# Patient Record
Sex: FEMALE | Race: WHITE | NOT HISPANIC OR LATINO | Employment: FULL TIME
[De-identification: names, ages, dates, MRNs, and addresses within clinical notes are randomized per-mention and may not be internally consistent; named-entity substitution may affect disease eponyms.]

---
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
## 2019-09-30 ENCOUNTER — ANCILLARY PROCEDURE (OUTPATIENT)
Dept: GENERAL RADIOLOGY | Facility: CLINIC | Age: 19
End: 2019-09-30
Attending: PHYSICIAN ASSISTANT
Payer: COMMERCIAL

## 2019-09-30 ENCOUNTER — OFFICE VISIT (OUTPATIENT)
Dept: URGENT CARE | Facility: URGENT CARE | Age: 19
End: 2019-09-30
Payer: COMMERCIAL

## 2019-09-30 VITALS
DIASTOLIC BLOOD PRESSURE: 70 MMHG | BODY MASS INDEX: 23.9 KG/M2 | SYSTOLIC BLOOD PRESSURE: 115 MMHG | OXYGEN SATURATION: 100 % | RESPIRATION RATE: 16 BRPM | HEART RATE: 88 BPM | WEIGHT: 140 LBS | HEIGHT: 64 IN
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
## 2019-09-30 DIAGNOSIS — M25.531 RIGHT WRIST PAIN: ICD-10-CM

## 2019-09-30 DIAGNOSIS — M25.531 RIGHT WRIST PAIN: Primary | ICD-10-CM

## 2019-09-30 PROCEDURE — 99203 OFFICE O/P NEW LOW 30 MIN: CPT | Performed by: PHYSICIAN ASSISTANT

## 2019-09-30 PROCEDURE — 73110 X-RAY EXAM OF WRIST: CPT | Mod: RT

## 2019-09-30 RX ORDER — IBUPROFEN 600 MG/1
600 TABLET, FILM COATED ORAL EVERY 6 HOURS PRN
Qty: 30 TABLET | Refills: 0 | Status: SHIPPED | OUTPATIENT
Start: 2019-09-30

## 2019-09-30 ASSESSMENT — MIFFLIN-ST. JEOR: SCORE: 1390.04

## 2019-09-30 NOTE — PROGRESS NOTES
"SUBJECTIVE:  Chief Complaint   Patient presents with     Wrist Pain     R wrist pain off and on for 3 monts. Pt states the last few weeks it has been constant.      Dionne Schulz is a 19 year old female presents with a chief complaint of right wrist pain and tenderness.  How: overuse injury.  The patient complained of mild and moderate pain  and has had decreased ROM.  Pain exacerbated by movement.  Relieved by rest.  She treated it initially with no therapy. This is the first time this type of injury has occurred to this patient.     Past Medical History:   Diagnosis Date     Hypertrophy of tonsil with adenoids      Primary hypotony of eye 11/99    Central hypotonia/static encephalopathy.     Allergies   Allergen Reactions     Azithromycin      Cefprozil      Cephalosporins      Penicillins      Social History     Tobacco Use     Smoking status: Never Smoker     Smokeless tobacco: Never Used   Substance Use Topics     Alcohol use: Not on file     Family History   Problem Relation Age of Onset     Allergies Mother         seaonal     Diabetes Mother      Cardiovascular Mother      Cancer Mother      Cancer Father      Alcohol/Drug Father         pat grandfather       ROS:  CONSTITUTIONAL:NEGATIVE for fever, chills, change in weight  INTEGUMENTARY/SKIN: NEGATIVE for worrisome rashes, moles or lesions  ENT/MOUTH: NEGATIVE for ear, mouth and throat problems  RESP:NEGATIVE for significant cough or SOB  CV: NEGATIVE for chest pain, palpitations or peripheral edema  GI: NEGATIVE for nausea, abdominal pain, heartburn, or change in bowel habits  : negative for and dysuria  MUSCULOSKELETAL: POSITIVE  for right wrist tenderness, DROM  NEURO: NEGATIVE for weakness, dizziness or paresthesias  VASC: NEGATIVE for cool extremities    EXAM:   /70 (BP Location: Right arm, Patient Position: Sitting, Cuff Size: Adult Regular)   Pulse 88   Resp 16   Ht 1.626 m (5' 4\")   Wt 63.5 kg (140 lb)   SpO2 100%   BMI 24.03 " kg/m    Gen: healthy,alert,no distress  Extremity: wrist has point tenderness dorsal wrist, localized.   There is not compromise to the distal circulation.  Pulses are +2 and CRT is brisk  NECK: supple, non-tender to palpation, FROM   CHEST: clear to auscultation  CV: regular rate and rhythm  EXTREMITIES: peripheral pulses normal  MS:  tender to palpation dorsal wrist  SKIN: no suspicious lesions or rashes  NEURO: Normal strength and tone, sensory exam grossly normal, mentation intact and speech normal    X-RAY was done and negative for acute changes including fracture Xray read by Erik Holbrook PA-C at time of visit    ASSESSMENT/PLAN      ICD-10-CM    1. Right wrist pain M25.531 XR Wrist Right G/E 3 Views     ibuprofen (ADVIL/MOTRIN) 600 MG tablet     order for DME   2. Overuse injury T14.8XXA XR Wrist Right G/E 3 Views     ibuprofen (ADVIL/MOTRIN) 600 MG tablet     order for DME       RICE treatment: Rest, Ice, compression, elevation   Wear wrist brace  Follow up with [facility] if symptoms persist  Note written for work

## 2019-09-30 NOTE — LETTER
September 30, 2019      Dionne Schulz  [address]  [place] [address]        To Whom It May Concern:    Dionne Schulz was seen on 9/30/19.  Please allow her to wear wrist brace while working.  There are no work restrictions.         Sincerely,        Erik Holbrook PA-C

## 2019-10-07 ENCOUNTER — HOSPITAL ENCOUNTER (EMERGENCY)
Facility: CLINIC | Age: 19
Discharge: HOME | End: 2019-10-07
Attending: PHYSICIAN ASSISTANT | Admitting: PHYSICIAN ASSISTANT
Payer: COMMERCIAL

## 2019-10-07 VITALS
HEART RATE: 99 BPM | DIASTOLIC BLOOD PRESSURE: 78 MMHG | OXYGEN SATURATION: 99 % | TEMPERATURE: 98.3 F | SYSTOLIC BLOOD PRESSURE: 134 MMHG | RESPIRATION RATE: 18 BRPM

## 2019-10-07 DIAGNOSIS — R05.9 COUGH: ICD-10-CM

## 2019-10-07 DIAGNOSIS — J06.9 UPPER RESPIRATORY TRACT INFECTION, UNSPECIFIED TYPE: ICD-10-CM

## 2019-10-07 LAB
DEPRECATED S PYO AG THROAT QL EIA: NORMAL
SPECIMEN SOURCE: NORMAL

## 2019-10-07 PROCEDURE — 87880 STREP A ASSAY W/OPTIC: CPT | Performed by: EMERGENCY MEDICINE

## 2019-10-07 PROCEDURE — 87081 CULTURE SCREEN ONLY: CPT | Performed by: PHYSICIAN ASSISTANT

## 2019-10-07 PROCEDURE — 99283 EMERGENCY DEPT VISIT LOW MDM: CPT

## 2019-10-07 PROCEDURE — 25000132 ZZH RX MED GY IP 250 OP 250 PS 637: Performed by: PHYSICIAN ASSISTANT

## 2019-10-07 RX ORDER — ACETAMINOPHEN 325 MG/1
650 TABLET ORAL ONCE
Status: COMPLETED | OUTPATIENT
Start: 2019-10-07 | End: 2019-10-07

## 2019-10-07 RX ORDER — BENZONATATE 200 MG/1
200 CAPSULE ORAL 3 TIMES DAILY PRN
Qty: 15 CAPSULE | Refills: 0 | Status: SHIPPED | OUTPATIENT
Start: 2019-10-07

## 2019-10-07 RX ADMIN — ACETAMINOPHEN 650 MG: 325 TABLET, FILM COATED ORAL at 22:10

## 2019-10-07 ASSESSMENT — ENCOUNTER SYMPTOMS
RHINORRHEA: 1
SORE THROAT: 1
COUGH: 1
NAUSEA: 1

## 2019-10-07 NOTE — ED AVS SNAPSHOT
[facility] Emergency Department  [address]  [facility] [address]  Phone:  [phone]  Fax:  [phone]                                    Dionne Schulz   MRN: [id number]    Department:  [facility] Emergency Department   Date of Visit:  10/7/2019           After Visit Summary Signature Page    I have received my discharge instructions, and my questions have been answered. I have discussed any challenges I see with this plan with the nurse or doctor.    ..........................................................................................................................................  Patient/Patient Representative Signature      ..........................................................................................................................................  Patient Representative Print Name and Relationship to Patient    ..................................................               ................................................  Date                                   Time    ..........................................................................................................................................  Reviewed by Signature/Title    ...................................................              ..............................................  Date                                               Time          22EPIC Rev 08/18

## 2019-10-07 NOTE — LETTER
October 7, 2019      To Whom It May Concern:      Dionne Schulz was seen in our Emergency Department today, 10/07/19. Please excuse her from work tomorrow(10/8). I expect her condition to improve over the next 2-3 days.  She may return to work/school when improved.    Sincerely,        Rowan Jeffries PA-C

## 2019-10-08 ASSESSMENT — ENCOUNTER SYMPTOMS: VOMITING: 0

## 2019-10-08 NOTE — DISCHARGE INSTRUCTIONS

## 2019-10-08 NOTE — ED TRIAGE NOTES
Here for left ear pain x3-4 days, sore throat, coughing, and nausea x4-5 week, sneezing x1 week. ABCs intact.

## 2019-10-08 NOTE — ED PROVIDER NOTES
History     Chief Complaint:  Ear Pain    HPI   Dionne Schulz is a 19 year old female with a history of asthma who presents with ear pain. The patient reports approximately one week of nausea(without vomiting), as well as four days of left sided ear pain, a productive cough with associated yellow phlegm, a sore throat, sneezing, an rhinorrhea with associated green mucous. As symptoms worsened tonight, she presents today for evaluation and treatment. Here the patient endorses utilizing 600 mg ibuprofen for management of wrist pain, therefore she is unsure if a fever has been present. The patient denies smoking. She denies any known sick contacts, recent travel, and has no further concerns at this time.     Allergies:  Azithromycin  Cefprozil  Cephalosporins  Penicillins   Augmentin    Medications:    Albuterol  Nuvaring  Advair diskus    Past Medical History:    Hypertrophy of tonsils  Lack of coordination  Primary hypotony of eye  Kidney, medullary sponge  Asthma  Von willebrand disease type IA  Attention deficit hyperactivity disorder  Obsessive compulsive disorder    Past Surgical History:    Tonsillectomy and adenoidectomy  Anes nose sinus surgery  Myringotomy with tube placement    Family History:    Mother: diabetes, cardiovascular, cancer  Father: cancer, alcohol/drug    Social History:  The patient was accompanied to the ED by her boyfriend.  Smoking Status: Never Smoker  Smokeless Tobacco: Never Used  PCP: Raúl Murcia  Marital Status:  Single      Review of Systems   HENT: Positive for ear pain, rhinorrhea, sneezing and sore throat.    Respiratory: Positive for cough.    Gastrointestinal: Positive for nausea. Negative for vomiting.   All other systems reviewed and are negative.    Physical Exam     Patient Vitals for the past 24 hrs:   BP Temp Temp src Pulse Heart Rate Resp SpO2   10/07/19 2142 134/78 98.3  F (36.8  C) Oral 99 99 18 99 %     Physical Exam  General: Resting comfortably.  Alert  and oriented.   Head:  The scalp, face, and head appear normal   Eyes:  Conjunctivae and sclerae are normal    ENT:    The oropharynx is normal    Uvula is in the midline     Mild pharyngeal erythema.  No tonsillar hypertrophy or exudate.  Structures are symmetric.    Bilateral TMs are normal without evidence of infection.   Neck:  No lymphadenopathy  CV:  Regular rate and rhythm     Normal S1/S2  Resp:  Lungs are clear to auscultation    Non-labored    No rales or wheezing   MS:  Normal muscular tone   Skin:  No rash or acute skin lesions noted   Neuro: Speech is normal and fluent.       Emergency Department Course     Laboratory:  Laboratory findings were communicated with the patient who voiced understanding of the findings.    Rapid Strep Screen: negative  Beta Strep Group A Culture: pending    Interventions:  2210 Tylenol 650 mg Oral    Emergency Department Course:    2144 A throat sample was obtained for laboratory testing as documented above.    2202 Nursing notes and vitals reviewed.    2207 I performed an exam of the patient as documented above.     2252 Patient rechecked and updated.      Findings and plan explained to the patient. Patient discharged home with instructions regarding supportive care, medications, and reasons to return. The importance of close follow-up was reviewed. The patient was prescribed tessalon.    Impression & Plan      Medical Decision Making:  Dionne Schulz is a 19 year old female who presents for evaluation of nasal congestion, cough and ear pain.  This is consistent with an upper respiratory tract infection.  There is no signs at this point of serious bacterial infection such as OM, retropharyngeal abscess, epiglottitis, peritonsillar abscess, strep pharyngitis, pneumonia, sinusitis, meningitis, bacteremia. Rapid strep test is negative. Culture pending. She was instructed she will only be contacted if her culture returns positive and initiated on antibiotic therapy at that  time. The patient is well hydrated and otherwise well-appearing.  Given clear lungs, no fever, no hypoxia and no respiratory distress I do not feel patient needs a CXR at this point as the probability of bacterial pneumonia is very unlikely.  The patient was treated with tylenol in the ED and is discharged with prescriptions for symptomatic treatment.  She will also increase rest and hydration. She was instructed about the importance of close followup with primary care physician is recommended and precautions are given to return to ED for fever > 103, respiratory distress, protracted vomiting, confusion or any worsening symptoms. All questions were answered prior to discharge. The patient understands and agrees to this plan.      Diagnosis:    ICD-10-CM    1. Cough R05    2. Upper respiratory tract infection, unspecified type J06.9      Disposition:   The patient is discharged to home.    Discharge Medications:        START taking      Dose / Directions   benzonatate 200 MG capsule  Commonly known as:  TESSALON      Dose:  200 mg  Take 1 capsule (200 mg) by mouth 3 times daily as needed for cough  Quantity:  15 capsule  Refills:  0              Where to get your medicines      Some of these will need a paper prescription and others can be bought over the counter. Ask your nurse if you have questions.    Bring a paper prescription for each of these medications    benzonatate 200 MG capsule       Scribe Disclosure:  I, Dulce Paz, am serving as a scribe at 9:54 PM on 10/7/2019 to document services personally performed by Rowan Jeffries PA-C based on my observations and the provider's statements to me.    [facility] EMERGENCY DEPARTMENT       Rowan Jeffries PA-C  10/08/19 1541

## 2019-10-10 LAB
BACTERIA SPEC CULT: NORMAL
SPECIMEN SOURCE: NORMAL

## 2019-10-10 NOTE — RESULT ENCOUNTER NOTE
Final Beta strep group A r/o culture is NEGATIVE for Group A streptococcus.    No treatment or change in treatment per [facility] Strep protocol.

## 2019-10-29 ENCOUNTER — HOSPITAL ENCOUNTER (EMERGENCY)
Facility: CLINIC | Age: 19
Discharge: HOME | End: 2019-10-29
Attending: EMERGENCY MEDICINE | Admitting: EMERGENCY MEDICINE
Payer: COMMERCIAL

## 2019-10-29 ENCOUNTER — APPOINTMENT (OUTPATIENT)
Dept: CT IMAGING | Facility: CLINIC | Age: 19
End: 2019-10-29
Attending: EMERGENCY MEDICINE
Payer: COMMERCIAL

## 2019-10-29 VITALS
DIASTOLIC BLOOD PRESSURE: 68 MMHG | SYSTOLIC BLOOD PRESSURE: 121 MMHG | TEMPERATURE: 98 F | OXYGEN SATURATION: 100 % | HEART RATE: 82 BPM | RESPIRATION RATE: 18 BRPM

## 2019-10-29 DIAGNOSIS — N10 ACUTE PYELONEPHRITIS: ICD-10-CM

## 2019-10-29 LAB
ALBUMIN SERPL-MCNC: 3.1 G/DL (ref 3.4–5)
ALBUMIN UR-MCNC: 20 MG/DL
ALP SERPL-CCNC: 54 U/L (ref 40–150)
ALT SERPL W P-5'-P-CCNC: 12 U/L (ref 0–50)
ANION GAP SERPL CALCULATED.3IONS-SCNC: 5 MMOL/L (ref 3–14)
APPEARANCE UR: ABNORMAL
AST SERPL W P-5'-P-CCNC: 11 U/L (ref 0–45)
B-HCG FREE SERPL-ACNC: <5 IU/L
BACTERIA #/AREA URNS HPF: ABNORMAL /HPF
BASOPHILS # BLD AUTO: 0.1 10E9/L (ref 0–0.2)
BASOPHILS NFR BLD AUTO: 0.4 %
BILIRUB SERPL-MCNC: 0.4 MG/DL (ref 0.2–1.3)
BILIRUB UR QL STRIP: NEGATIVE
BUN SERPL-MCNC: 11 MG/DL (ref 7–30)
CALCIUM SERPL-MCNC: 8.4 MG/DL (ref 8.5–10.1)
CHLORIDE SERPL-SCNC: 112 MMOL/L (ref 94–109)
CO2 SERPL-SCNC: 23 MMOL/L (ref 20–32)
COLOR UR AUTO: ABNORMAL
CREAT SERPL-MCNC: 0.69 MG/DL (ref 0.52–1.04)
DIFFERENTIAL METHOD BLD: ABNORMAL
EOSINOPHIL # BLD AUTO: 0.5 10E9/L (ref 0–0.7)
EOSINOPHIL NFR BLD AUTO: 3.8 %
ERYTHROCYTE [DISTWIDTH] IN BLOOD BY AUTOMATED COUNT: 12.4 % (ref 10–15)
GFR SERPL CREATININE-BSD FRML MDRD: >90 ML/MIN/{1.73_M2}
GLUCOSE SERPL-MCNC: 94 MG/DL (ref 70–99)
GLUCOSE UR STRIP-MCNC: NEGATIVE MG/DL
HCT VFR BLD AUTO: 37 % (ref 35–47)
HGB BLD-MCNC: 12.4 G/DL (ref 11.7–15.7)
HGB UR QL STRIP: ABNORMAL
IMM GRANULOCYTES # BLD: 0 10E9/L (ref 0–0.4)
IMM GRANULOCYTES NFR BLD: 0.3 %
KETONES UR STRIP-MCNC: NEGATIVE MG/DL
LEUKOCYTE ESTERASE UR QL STRIP: ABNORMAL
LYMPHOCYTES # BLD AUTO: 2.8 10E9/L (ref 0.8–5.3)
LYMPHOCYTES NFR BLD AUTO: 22.8 %
MCH RBC QN AUTO: 28.6 PG (ref 26.5–33)
MCHC RBC AUTO-ENTMCNC: 33.5 G/DL (ref 31.5–36.5)
MCV RBC AUTO: 85 FL (ref 78–100)
MONOCYTES # BLD AUTO: 0.7 10E9/L (ref 0–1.3)
MONOCYTES NFR BLD AUTO: 5.7 %
MUCOUS THREADS #/AREA URNS LPF: PRESENT /LPF
NEUTROPHILS # BLD AUTO: 8.1 10E9/L (ref 1.6–8.3)
NEUTROPHILS NFR BLD AUTO: 67 %
NITRATE UR QL: NEGATIVE
NRBC # BLD AUTO: 0 10*3/UL
NRBC BLD AUTO-RTO: 0 /100
PH UR STRIP: 6.5 PH (ref 5–7)
PLATELET # BLD AUTO: 226 10E9/L (ref 150–450)
POTASSIUM SERPL-SCNC: 3.7 MMOL/L (ref 3.4–5.3)
PROT SERPL-MCNC: 6.3 G/DL (ref 6.8–8.8)
RBC # BLD AUTO: 4.34 10E12/L (ref 3.8–5.2)
RBC #/AREA URNS AUTO: 9 /HPF (ref 0–2)
SODIUM SERPL-SCNC: 140 MMOL/L (ref 133–144)
SOURCE: ABNORMAL
SP GR UR STRIP: 1.01 (ref 1–1.03)
SQUAMOUS #/AREA URNS AUTO: 2 /HPF (ref 0–1)
UROBILINOGEN UR STRIP-MCNC: NORMAL MG/DL (ref 0–2)
WBC # BLD AUTO: 12.1 10E9/L (ref 4–11)
WBC #/AREA URNS AUTO: >182 /HPF (ref 0–5)

## 2019-10-29 PROCEDURE — 87088 URINE BACTERIA CULTURE: CPT | Performed by: EMERGENCY MEDICINE

## 2019-10-29 PROCEDURE — 74177 CT ABD & PELVIS W/CONTRAST: CPT

## 2019-10-29 PROCEDURE — 96360 HYDRATION IV INFUSION INIT: CPT

## 2019-10-29 PROCEDURE — 84702 CHORIONIC GONADOTROPIN TEST: CPT

## 2019-10-29 PROCEDURE — 25000128 H RX IP 250 OP 636: Performed by: EMERGENCY MEDICINE

## 2019-10-29 PROCEDURE — 81001 URINALYSIS AUTO W/SCOPE: CPT | Performed by: EMERGENCY MEDICINE

## 2019-10-29 PROCEDURE — 99285 EMERGENCY DEPT VISIT HI MDM: CPT | Mod: 25

## 2019-10-29 PROCEDURE — 25000132 ZZH RX MED GY IP 250 OP 250 PS 637: Performed by: EMERGENCY MEDICINE

## 2019-10-29 PROCEDURE — 85025 COMPLETE CBC W/AUTO DIFF WBC: CPT | Performed by: EMERGENCY MEDICINE

## 2019-10-29 PROCEDURE — 87186 SC STD MICRODIL/AGAR DIL: CPT | Performed by: EMERGENCY MEDICINE

## 2019-10-29 PROCEDURE — 25000125 ZZHC RX 250: Performed by: EMERGENCY MEDICINE

## 2019-10-29 PROCEDURE — 80053 COMPREHEN METABOLIC PANEL: CPT | Performed by: EMERGENCY MEDICINE

## 2019-10-29 PROCEDURE — 87086 URINE CULTURE/COLONY COUNT: CPT | Performed by: EMERGENCY MEDICINE

## 2019-10-29 RX ORDER — ACETAMINOPHEN 500 MG
1000 TABLET ORAL ONCE
Status: COMPLETED | OUTPATIENT
Start: 2019-10-29 | End: 2019-10-29

## 2019-10-29 RX ORDER — CIPROFLOXACIN 500 MG/1
500 TABLET, FILM COATED ORAL 2 TIMES DAILY
Qty: 14 TABLET | Refills: 0 | Status: SHIPPED | OUTPATIENT
Start: 2019-10-29 | End: 2019-11-05

## 2019-10-29 RX ORDER — CELECOXIB 200 MG/1
200 CAPSULE ORAL DAILY PRN
Qty: 6 CAPSULE | Refills: 0 | Status: SHIPPED | OUTPATIENT
Start: 2019-10-29 | End: 2019-12-09

## 2019-10-29 RX ORDER — IOPAMIDOL 755 MG/ML
500 INJECTION, SOLUTION INTRAVASCULAR ONCE
Status: COMPLETED | OUTPATIENT
Start: 2019-10-29 | End: 2019-10-29

## 2019-10-29 RX ORDER — ONDANSETRON 4 MG/1
4 TABLET, ORALLY DISINTEGRATING ORAL EVERY 8 HOURS PRN
Qty: 10 TABLET | Refills: 0 | Status: SHIPPED | OUTPATIENT
Start: 2019-10-29 | End: 2019-11-01

## 2019-10-29 RX ADMIN — IOPAMIDOL 71 ML: 755 INJECTION, SOLUTION INTRAVENOUS at 04:33

## 2019-10-29 RX ADMIN — SODIUM CHLORIDE 1000 ML: 9 INJECTION, SOLUTION INTRAVENOUS at 03:25

## 2019-10-29 RX ADMIN — ACETAMINOPHEN 1000 MG: 500 TABLET, FILM COATED ORAL at 03:24

## 2019-10-29 RX ADMIN — SODIUM CHLORIDE 58 ML: 9 INJECTION, SOLUTION INTRAVENOUS at 04:33

## 2019-10-29 ASSESSMENT — ENCOUNTER SYMPTOMS
ABDOMINAL PAIN: 1
FREQUENCY: 0
SHORTNESS OF BREATH: 0
HEMATURIA: 0
NAUSEA: 0
VOMITING: 0
CONSTIPATION: 0
DYSURIA: 0
DIARRHEA: 0

## 2019-10-29 NOTE — RESULT ENCOUNTER NOTE
Emergency Dept/Urgent Care discharge antibiotic (if prescribed): Ciprofloxacin (Cipro) 500 mg tablet, 1 tablet (500 mg) by mouth 2 times daily for 7 days.  Date of Rx (if applicable):  10/29/19  No changes in treatment per Urine culture protocol.

## 2019-10-29 NOTE — LETTER
October 29, 2019      To Whom It May Concern:      Dionne Schulz was seen in our Emergency Department today, 10/29/19.  I expect her condition to improve over the next 2-3 days.  She may return to work/school when improved.    Sincerely,        Dania Foley RN

## 2019-10-29 NOTE — ED PROVIDER NOTES
"  History     Chief Complaint:  Abdominal Pain    HPI   Dionne Schulz is a 19 year old female with a history of Ehler's-Danlos syndrome and Von Willebrand disease who presents with right sided abdominal pain. The patient reports that she went to bed last night feeling fine, however, woke up 45 minutes prior to arrival with severe right sided abdominal pain. She states the pain hurts worse in the middle and describes the pain as feeling like her abdomen is constricting or \"pumping like a heart.\" She reports that walking, movement, or breathing exacerbates the pain. She denies any nausea, vomiting, urinary or bowel symptoms, shortness of breath, or chest pain. Of note, the patient's last period ended two weeks ago.    Allergies:  Azithromycin  Cefprozil  Cephalosporins  Penicillins    Medications:    Tessalon  Albuterol  Clindamycin  Ibuprofen  Motrin  Septra  Tylenol    Past Medical History:    Hypertrophy of tonsil with adenoids  Primary hypotony of eye  Asthma  Coagulation defect  Ehler's-Danlos syndrome  Von Willebrand Disease  ADHD  OCD  Kidney, medullary sponge    Past Surgical History:    Addnoidectomy  Myringotomy with tube placement  RI Anes Nose Sinus    Family History:    Seasonal Allergies  Diabetes  Cardiovascular  Cancer    Social History:  The patient was accompanied to the ED by significant other.  Smoking Status: Negative  Smokeless Tobacco: Negative  Marital Status:  Single [1]     Review of Systems   Respiratory: Negative for shortness of breath.    Cardiovascular: Negative for chest pain.   Gastrointestinal: Positive for abdominal pain. Negative for constipation, diarrhea, nausea and vomiting.   Genitourinary: Negative for dysuria, frequency and hematuria.   All other systems reviewed and are negative.      Physical Exam     Patient Vitals for the past 24 hrs:   BP Temp Temp src Pulse Heart Rate Resp SpO2   10/29/19 0422 121/68 -- -- 82 -- -- 100 %   10/29/19 0410 -- -- -- -- -- -- 99 % "   10/29/19 0315 129/76 -- -- 95 -- -- 98 %   10/29/19 0312 129/76 -- -- 95 -- -- 100 %   10/29/19 0305 132/76 98  F (36.7  C) Oral 105 105 18 100 %     Physical Exam  General: Adult female, uncomfortable appearing, lying on stretcher  Eyes: PERRL, Conjunctive within normal limits  ENT: Moist mucous membranes, oropharynx clear.   CV: Normal S1S2, no murmur, rub or gallop. Regular rate and rhythm  Resp: Clear to auscultation bilaterally, no wheezes, rales or rhonchi. Normal respiratory effort.  GI: Abdomen is soft and nondistended. Tender diffusely in the right abdomen, most prominent right mid. No palpable masses. No rebound or guarding.  MSK: No edema. Nontender. Normal active range of motion.  Skin: Warm and dry. No rashes or lesions or ecchymoses on visible skin.  Neuro: Alert and oriented. Responds appropriately to all questions and commands. No focal findings appreciated. Normal muscle tone.  Psych: Normal mood and affect.      Emergency Department Course   Imaging:  Radiographic findings were communicated with the patient who voiced understanding of the findings.  CT Abdomen/Pelvis with IV contrast:   1.  No bowel obstruction or inflammation. No appendicitis.    2.  Wall thickening of the right ureter and mild surrounding inflammation. There is no ureteral stone or hydronephrosis. This may be due to inflammation/infection.    3.  Several small bilateral renal cortical cysts. Some of these cysts have internal calcifications.    4.  Small amount of free fluid in the pelvis as per radiology.    Laboratory:  CBC: WBC: 12.1, HGB: 12.4, PLT: 226  CMP: Glucose 94, Chloride 112 (H), Calcium 8.4 (L), Albumin 3.1 (L), Protein total 6.3 (L), o/w WNL (Creatinine: 0.69)    ISTAT HCG Quantitative: <5.0    UA with micro: Blood small (A), Protein albumin 20 (A), Leukocyte esterase large (A), WBC >182 (H), RBC 9 (H), Squamous epithelial 2 (H), Mucous present (A) o/w negative  Urine Culture Aerobic Bacteria:  Pending    Interventions:  0324 Tylenol 1,000 mg PO   0325 NS 1L IV    Emergency Department Course:  Nursing notes and vitals reviewed. (0313) I performed an exam of the patient as documented above.      IV inserted. Medicine administered as documented above. Blood drawn. This was sent to the lab for further testing, results above.     The patient was sent for a CT Abdomen Pelvis while in the emergency department, findings above.      (0455) I rechecked the patient and discussed the results of her workup thus far. She is comfortable appearing, reports improvement.      Findings and plan explained to the Patient. Patient discharged home with instructions regarding supportive care, medications, and reasons to return. The importance of close follow-up was reviewed. The patient was prescribed Celebrex, Cipro, and Zofran.     I personally reviewed the laboratory and imaging results with the Patient and answered all related questions prior to discharge.     Impression & Plan    Medical Decision Making:  Dionne Schulz is a 19 year old femalewho presents to the ED today for evaluation of abdominal pain.Details of the patient's history can be noted in the HPI.  Differential diagnosis included UTI, pyelonephritis, nephrolithiasis, urethral lithiasis, infected stone, renal abscess, appendicitis, colitis, diverticulitis, intra-abdominal hemorrhage, amongst others.  UA was obtained and showed signs of infection.  Basic laboratory analysis yielded no significant leukocytosis and normal kidney functioning.  CT was obtained to rule out ureterolithiasis with infection.This returned showing no signs of obstructing stone.  Patient was not given 1 dose of IV antibiotics here in the ED due to multiple allergies and well appearance.  As they appear well, have no significant fever or nausea or vomiting, I feel comfortable with patient's discharge and outpatient management.  She will be discharged with ciprofloxacin due to her multiple  allergies this seemed most appropriate.  Urine culture pending. They will follow-up with her primary care provider within the next few days for recheck.  They will return to the ED for any changing or worsening symptoms, uncontrolled nausea or vomiting/inability to tolerate oral antibiotic, persistent fevers >103F, increasing pain, new concerns.  All questions answered prior to the patient's discharge.  They are in agreement with the treatment plan as stated above.    Diagnosis:    ICD-10-CM    1. Acute pyelonephritis N10      Disposition:  discharged to home    Discharge Medications:  New Prescriptions    CELECOXIB (CELEBREX) 200 MG CAPSULE    Take 1 capsule (200 mg) by mouth daily as needed for pain    CIPROFLOXACIN (CIPRO) 500 MG TABLET    Take 1 tablet (500 mg) by mouth 2 times daily for 7 days    ONDANSETRON (ZOFRAN ODT) 4 MG ODT TAB    Take 1 tablet (4 mg) by mouth every 8 hours as needed for nausea or vomiting     Scribe Disclosure:  ILora, am serving as a scribe on 10/29/2019 at 3:10 AM to personally document services performed by Yudi Garay MD based on my observations and the provider's statements to me.     Lora Silva  10/29/2019   [facility] EMERGENCY DEPARTMENT       Yudi Garay MD  10/29/19 0543

## 2019-10-29 NOTE — ED AVS SNAPSHOT
[facility] Emergency Department  [address]  [facility] [address]  Phone:  [phone]  Fax:  [phone]                                    Dionne Schulz   MRN: [id number]    Department:  [facility] Emergency Department   Date of Visit:  10/29/2019           After Visit Summary Signature Page    I have received my discharge instructions, and my questions have been answered. I have discussed any challenges I see with this plan with the nurse or doctor.    ..........................................................................................................................................  Patient/Patient Representative Signature      ..........................................................................................................................................  Patient Representative Print Name and Relationship to Patient    ..................................................               ................................................  Date                                   Time    ..........................................................................................................................................  Reviewed by Signature/Title    ...................................................              ..............................................  Date                                               Time          22EPIC Rev 08/18

## 2019-10-30 LAB
BACTERIA SPEC CULT: ABNORMAL
Lab: ABNORMAL
SPECIMEN SOURCE: ABNORMAL

## 2019-12-09 ENCOUNTER — HOSPITAL ENCOUNTER (EMERGENCY)
Facility: CLINIC | Age: 19
Discharge: HOME | End: 2019-12-09
Attending: EMERGENCY MEDICINE | Admitting: EMERGENCY MEDICINE
Payer: COMMERCIAL

## 2019-12-09 ENCOUNTER — APPOINTMENT (OUTPATIENT)
Dept: ULTRASOUND IMAGING | Facility: CLINIC | Age: 19
End: 2019-12-09
Attending: EMERGENCY MEDICINE
Payer: COMMERCIAL

## 2019-12-09 VITALS
HEIGHT: 64 IN | WEIGHT: 135 LBS | HEART RATE: 83 BPM | OXYGEN SATURATION: 97 % | SYSTOLIC BLOOD PRESSURE: 117 MMHG | BODY MASS INDEX: 23.05 KG/M2 | RESPIRATION RATE: 18 BRPM | TEMPERATURE: 99.9 F | DIASTOLIC BLOOD PRESSURE: 70 MMHG

## 2019-12-09 DIAGNOSIS — N39.0 ACUTE UTI: ICD-10-CM

## 2019-12-09 DIAGNOSIS — R10.9 FLANK PAIN: ICD-10-CM

## 2019-12-09 LAB
ALBUMIN SERPL-MCNC: 3.2 G/DL (ref 3.4–5)
ALBUMIN UR-MCNC: 30 MG/DL
ALP SERPL-CCNC: 56 U/L (ref 40–150)
ALT SERPL W P-5'-P-CCNC: 16 U/L (ref 0–50)
ANION GAP SERPL CALCULATED.3IONS-SCNC: 7 MMOL/L (ref 3–14)
APPEARANCE UR: ABNORMAL
AST SERPL W P-5'-P-CCNC: 14 U/L (ref 0–45)
B-HCG FREE SERPL-ACNC: <5 IU/L
BACTERIA #/AREA URNS HPF: ABNORMAL /HPF
BASOPHILS # BLD AUTO: 0 10E9/L (ref 0–0.2)
BASOPHILS NFR BLD AUTO: 0.3 %
BILIRUB SERPL-MCNC: 0.3 MG/DL (ref 0.2–1.3)
BILIRUB UR QL STRIP: NEGATIVE
BUN SERPL-MCNC: 11 MG/DL (ref 7–30)
CALCIUM SERPL-MCNC: 8.8 MG/DL (ref 8.5–10.1)
CHLORIDE SERPL-SCNC: 108 MMOL/L (ref 94–109)
CO2 SERPL-SCNC: 23 MMOL/L (ref 20–32)
COLOR UR AUTO: YELLOW
CREAT SERPL-MCNC: 0.63 MG/DL (ref 0.52–1.04)
DIFFERENTIAL METHOD BLD: ABNORMAL
EOSINOPHIL # BLD AUTO: 0.1 10E9/L (ref 0–0.7)
EOSINOPHIL NFR BLD AUTO: 0.8 %
ERYTHROCYTE [DISTWIDTH] IN BLOOD BY AUTOMATED COUNT: 13.2 % (ref 10–15)
GFR SERPL CREATININE-BSD FRML MDRD: >90 ML/MIN/{1.73_M2}
GLUCOSE SERPL-MCNC: 85 MG/DL (ref 70–99)
GLUCOSE UR STRIP-MCNC: NEGATIVE MG/DL
HCT VFR BLD AUTO: 34.3 % (ref 35–47)
HGB BLD-MCNC: 11.3 G/DL (ref 11.7–15.7)
HGB UR QL STRIP: ABNORMAL
IMM GRANULOCYTES # BLD: 0 10E9/L (ref 0–0.4)
IMM GRANULOCYTES NFR BLD: 0.3 %
KETONES UR STRIP-MCNC: 20 MG/DL
LEUKOCYTE ESTERASE UR QL STRIP: ABNORMAL
LIPASE SERPL-CCNC: 76 U/L (ref 73–393)
LYMPHOCYTES # BLD AUTO: 1.4 10E9/L (ref 0.8–5.3)
LYMPHOCYTES NFR BLD AUTO: 15.5 %
MCH RBC QN AUTO: 28.4 PG (ref 26.5–33)
MCHC RBC AUTO-ENTMCNC: 32.9 G/DL (ref 31.5–36.5)
MCV RBC AUTO: 86 FL (ref 78–100)
MONOCYTES # BLD AUTO: 0.8 10E9/L (ref 0–1.3)
MONOCYTES NFR BLD AUTO: 8.2 %
MUCOUS THREADS #/AREA URNS LPF: PRESENT /LPF
NEUTROPHILS # BLD AUTO: 7 10E9/L (ref 1.6–8.3)
NEUTROPHILS NFR BLD AUTO: 74.9 %
NITRATE UR QL: NEGATIVE
NRBC # BLD AUTO: 0 10*3/UL
NRBC BLD AUTO-RTO: 0 /100
PH UR STRIP: 6.5 PH (ref 5–7)
PLATELET # BLD AUTO: 176 10E9/L (ref 150–450)
POTASSIUM SERPL-SCNC: 3.5 MMOL/L (ref 3.4–5.3)
PROT SERPL-MCNC: 6.5 G/DL (ref 6.8–8.8)
RBC # BLD AUTO: 3.98 10E12/L (ref 3.8–5.2)
RBC #/AREA URNS AUTO: 16 /HPF (ref 0–2)
SODIUM SERPL-SCNC: 138 MMOL/L (ref 133–144)
SOURCE: ABNORMAL
SP GR UR STRIP: 1.02 (ref 1–1.03)
SQUAMOUS #/AREA URNS AUTO: 8 /HPF (ref 0–1)
UROBILINOGEN UR STRIP-MCNC: NORMAL MG/DL (ref 0–2)
WBC # BLD AUTO: 9.3 10E9/L (ref 4–11)
WBC #/AREA URNS AUTO: 146 /HPF (ref 0–5)

## 2019-12-09 PROCEDURE — 25800030 ZZH RX IP 258 OP 636: Performed by: EMERGENCY MEDICINE

## 2019-12-09 PROCEDURE — 99284 EMERGENCY DEPT VISIT MOD MDM: CPT | Mod: 25

## 2019-12-09 PROCEDURE — 96360 HYDRATION IV INFUSION INIT: CPT

## 2019-12-09 PROCEDURE — 84702 CHORIONIC GONADOTROPIN TEST: CPT

## 2019-12-09 PROCEDURE — 87088 URINE BACTERIA CULTURE: CPT | Performed by: EMERGENCY MEDICINE

## 2019-12-09 PROCEDURE — 87186 SC STD MICRODIL/AGAR DIL: CPT | Performed by: EMERGENCY MEDICINE

## 2019-12-09 PROCEDURE — 85025 COMPLETE CBC W/AUTO DIFF WBC: CPT | Performed by: EMERGENCY MEDICINE

## 2019-12-09 PROCEDURE — 87086 URINE CULTURE/COLONY COUNT: CPT | Performed by: EMERGENCY MEDICINE

## 2019-12-09 PROCEDURE — 96361 HYDRATE IV INFUSION ADD-ON: CPT

## 2019-12-09 PROCEDURE — 76705 ECHO EXAM OF ABDOMEN: CPT

## 2019-12-09 PROCEDURE — 83690 ASSAY OF LIPASE: CPT | Performed by: EMERGENCY MEDICINE

## 2019-12-09 PROCEDURE — 80053 COMPREHEN METABOLIC PANEL: CPT | Performed by: EMERGENCY MEDICINE

## 2019-12-09 PROCEDURE — 81001 URINALYSIS AUTO W/SCOPE: CPT | Performed by: EMERGENCY MEDICINE

## 2019-12-09 RX ORDER — CELECOXIB 200 MG/1
200 CAPSULE ORAL DAILY PRN
Qty: 6 CAPSULE | Refills: 0 | Status: SHIPPED | OUTPATIENT
Start: 2019-12-09

## 2019-12-09 RX ORDER — CIPROFLOXACIN 500 MG/1
500 TABLET, FILM COATED ORAL 2 TIMES DAILY
Qty: 6 TABLET | Refills: 0 | Status: SHIPPED | OUTPATIENT
Start: 2019-12-09 | End: 2019-12-16

## 2019-12-09 RX ORDER — SODIUM CHLORIDE 9 MG/ML
1000 INJECTION, SOLUTION INTRAVENOUS CONTINUOUS
Status: DISCONTINUED | OUTPATIENT
Start: 2019-12-09 | End: 2019-12-09 | Stop reason: HOSPADM

## 2019-12-09 RX ADMIN — SODIUM CHLORIDE 1000 ML: 9 INJECTION, SOLUTION INTRAVENOUS at 15:16

## 2019-12-09 ASSESSMENT — ENCOUNTER SYMPTOMS
HEADACHES: 1
BACK PAIN: 1
FLANK PAIN: 1
NAUSEA: 0
VOMITING: 0
HEMATURIA: 1
CHILLS: 1
SHORTNESS OF BREATH: 0
DYSURIA: 0

## 2019-12-09 ASSESSMENT — MIFFLIN-ST. JEOR: SCORE: 1367.36

## 2019-12-09 NOTE — ED AVS SNAPSHOT
[facility] Emergency Department  [address]  [facility] [address]  Phone:  [phone]  Fax:  [phone]                                    Dionne Schulz   MRN: [id number]    Department:  [facility] Emergency Department   Date of Visit:  12/9/2019           After Visit Summary Signature Page    I have received my discharge instructions, and my questions have been answered. I have discussed any challenges I see with this plan with the nurse or doctor.    ..........................................................................................................................................  Patient/Patient Representative Signature      ..........................................................................................................................................  Patient Representative Print Name and Relationship to Patient    ..................................................               ................................................  Date                                   Time    ..........................................................................................................................................  Reviewed by Signature/Title    ...................................................              ..............................................  Date                                               Time          22EPIC Rev 08/18

## 2019-12-09 NOTE — ED PROVIDER NOTES
History     Chief Complaint:  Flank Pain    HPI   Dionne Schulz is a 19 year old female who presents to the emergency department today with flank pain. The patient reports pain started 4 days ago in the back and moved to her right flank 2 days ago.She rates her pain as 5/10. She had hematuria x2 yesterday with pink tinged urine, as well as chills and a mild headache. She denies dysuria, nausea, vomiting, chest pain, shortness of breath. This feels exactly like her previous pain that showed cysts in the right kidney, when she was diagnosed with a UTI and was prescribed antibiotics after which she recovered. She has been taking Ibuprofen for this pain, even though she was told she should not take NSAID's. She has no h/o kidney stones.    Allergies:  Azithromycin  Cefprozil  Cephalosporins  Penicillins     Medications:    Tessalon  Celebrex  Cipro  Zofran  Motrin  Septra  Celexa  Albuterol  Advair   Nuvaring   Stimate      Past Medical History:    Hypertrophy of tonsil with adenoids  Primary hypotony of eye  Asthma  Coagulation defect  Ehler's-Danlos syndrome  Von Willebrand Disease  ADHD  OCD  Kidney, medullary sponge     Past Surgical History:    Adenoidectomy  Myringotomy with tube placement  TX Anes Nose Sinus     Family History:    Seasonal Allergies  Diabetes  Cardiovascular  Cancer     Social History:  The patient was alone  Smoking Status: Negative  Smokeless Tobacco: Negative  Marital Status:  Single    Review of Systems   Constitutional: Positive for chills.   Respiratory: Negative for shortness of breath.    Cardiovascular: Negative for chest pain.   Gastrointestinal: Negative for nausea and vomiting.   Genitourinary: Positive for flank pain (right ) and hematuria. Negative for dysuria.   Musculoskeletal: Positive for back pain (right side).   Neurological: Positive for headaches.   All other systems reviewed and are negative.      Physical Exam     Patient Vitals for the past 24 hrs:   BP Temp Pulse  Refill requested for:     Atorvastatin 20 mg  Last filled on:     8/08/2017 #90  1 refill    Last office visit:   2/15/2017    Pending office visit: none  Spoke with patients wife she is aware patient is due for an appointment, wife transferred to  to schedule appt    Medication Refilled per protocol     "Heart Rate Resp SpO2 Height Weight   12/09/19 1700 -- -- -- -- -- 97 % -- --   12/09/19 1645 117/70 -- 83 -- -- 97 % -- --   12/09/19 1640 -- -- -- -- -- 97 % -- --   12/09/19 1635 -- -- -- -- -- 97 % -- --   12/09/19 1630 114/63 -- 84 -- -- 97 % -- --   12/09/19 1625 -- -- -- -- -- 97 % -- --   12/09/19 1620 -- -- -- -- -- 99 % -- --   12/09/19 1615 114/71 -- 80 -- -- 98 % -- --   12/09/19 1610 -- -- -- -- -- 97 % -- --   12/09/19 1605 -- -- -- -- -- 98 % -- --   12/09/19 1600 106/67 -- 88 -- -- 95 % -- --   12/09/19 1555 -- -- -- -- -- 99 % -- --   12/09/19 1545 -- -- -- -- -- 99 % -- --   12/09/19 1540 -- -- -- -- -- 98 % -- --   12/09/19 1535 -- -- -- -- -- 98 % -- --   12/09/19 1520 -- -- -- -- -- 98 % -- --   12/09/19 1515 108/58 -- 85 -- -- 98 % -- --   12/09/19 1510 -- -- -- -- -- 97 % -- --   12/09/19 1505 -- -- -- -- -- 99 % -- --   12/09/19 1500 -- -- -- -- -- 98 % -- --   12/09/19 1455 -- -- -- -- -- 98 % -- --   12/09/19 1450 -- -- -- -- -- 97 % -- --   12/09/19 1445 100/64 -- 97 -- -- 99 % -- --   12/09/19 1440 -- -- -- -- -- 98 % -- --   12/09/19 1435 -- -- -- -- -- 98 % -- --   12/09/19 1430 101/67 -- 92 -- -- 98 % -- --   12/09/19 1425 -- -- -- -- -- 99 % -- --   12/09/19 1420 -- -- 93 -- -- 98 % -- --   12/09/19 1415 110/63 -- -- -- -- -- -- --   12/09/19 1353 112/74 99.9  F (37.7  C) 116 116 18 98 % 1.626 m (5' 4\") 61.2 kg (135 lb)   12/09/19 1351 -- 99.9  F (37.7  C) -- -- -- -- -- --      Physical Exam  General: Adult female sitting upright  Eyes: PERRL, Conjunctive within normal limits. No scleral icterus.  ENT: Moist mucous membranes, oropharynx clear.   CV: Normal S1S2, no murmur, rub or gallop. Regular rate and rhythm  Resp: Clear to auscultation bilaterally, no wheezes, rales or rhonchi. Normal respiratory effort.  GI: Abdomen is soft and nondistended. RUQ tenderness to palpation. No palpable masses. No rebound or guarding. CVA tenderness to percussion on the left.  MSK: No edema. " Nontender. Normal active range of motion.  Skin: Warm and dry. No rashes or lesions or ecchymoses on visible skin.  Neuro: Alert and oriented. Responds appropriately to all questions and commands. No focal findings appreciated. Normal muscle tone.  Psych: Normal mood and affect. Pleasant.  Emergency Department Course   Imaging:  Radiology findings were communicated with the patient who voiced understanding of the findings.  Abdomen US, limited (RUQ only)   Final Result   IMPRESSION:     1. Normal appearance of the gallbladder. No gallstones. No biliary   dilatation.   2. Nonobstructing stones suggested at the lower right kidney.      STEFANIE CORDOVA MD      Report per radiology      Laboratory:  Laboratory findings were communicated with the patient who voiced understanding of the findings.  HCG: <5.0   Lipase: 76   CMP: 3.2 Albumin, 6.5 Protein total o/w WNL (Creatinine 0.63)   CBC: AWNL (WBC 9.3, HGB 11.3, )   UA: slightly cloudy, yellow urine with 20 ketones, small blood, 30 protein albumin, large Leukocyte esterase, 146 WBC, 16 RBC, moderate bacteria, 8 Squamous Epithelial, and mucous present o/w WNL   Urine Culture Aerobic Bacterial: Pending     Interventions:  1516: 0.9% NaCl 1L IV Bolus   Home med taken: celecoxib 200mg po     Emergency Department Course:  Nursing notes and vitals reviewed.  1437: I performed an exam of the patient as documented above. Patient  IV was inserted and blood was drawn for laboratory testing, results above.  The patient provided a urine sample here in the emergency department. This was sent for laboratory testing, findings above.  The patient was sent for an Abdomen US while in the emergency department, results above.    1645: Patient rechecked and updated.  She denies any new concerns. She is feeling improved.  1649: Findings and plan explained to the Patient. Patient discharged home with instructions regarding supportive care, medications, and reasons to return. The importance  of close follow-up was reviewed. The patient was prescribed Cipro and Celebrex.    I personally reviewed the laboratory and imaging results with the Patient and answered all related questions prior to discharge.      Impression & Plan    Medical Decision Making:  Dionne Schulz is a 19 year old female who presents for evaluation of right flank pain, hematuria, and chills.  This clinically is consistent with a urinary tract infection.  Urinalysis confirms the infection.  There has been no fever but there has been flank pain without further clinical evidence of pyelonephritis. No clinical evidence to suggest appendicitis, colitis, diverticulitis or any intraabdominal catastrophe. The patient will be started on antibiotics for the infection, longer course given concern for early pyelonephritis. Return if increasing pain, vomiting, fever, or inability to tolerate the oral antibiotic.  Follow up with primary physician is indicated if not improving in 2-3 days.     Diagnosis:    ICD-10-CM    1. Acute UTI N39.0    2. Flank pain R10.9        Disposition:  discharged to home    Discharge Medications:  Cipro   Celebrex      Scribe Disclosure:  Chika ISSA MD, am serving as a scribe at 2:43 PM on 12/9/2019 to document services personally performed by Yudi Garay MD based on my observations and the provider's statements to me.    12/9/2019   [facility] EMERGENCY DEPARTMENT       Yudi Garay MD  12/09/19 0165

## 2019-12-09 NOTE — ED NOTES
Per MD Garay, it is okay for patient to take home medication, Celecoxib. Patient took one 200 mg PO tab while here.

## 2019-12-09 NOTE — ED TRIAGE NOTES
Patient presents with complaints of right flank pain and blood in her urine. Pain started on Thursday, Patient had blood uin the urine yesterday. Patient called her PCP who told her to comer to ER. Patient states she has history of cysts on her right kidney. ABC intact without need for intervention at this time.

## 2019-12-10 NOTE — RESULT ENCOUNTER NOTE
Emergency Dept/Urgent Care discharge antibiotic (if prescribed): Ciprofloxacin (Cipro) 500 mg tablet, 1 tablet (500 mg) by mouth 2 times daily for 7 days.  Date of Rx (if applicable):  12/9/19  No changes in treatment per Urine culture protocol.

## 2019-12-11 LAB
BACTERIA SPEC CULT: ABNORMAL
Lab: ABNORMAL
SPECIMEN SOURCE: ABNORMAL

## 2019-12-11 NOTE — RESULT ENCOUNTER NOTE
Final Urine Culture Report on 12/11/19  Emergency Dept/Urgent Care discharge antibiotic prescribed: Ciprofloxacin (Cipro) 500 mg tablet, 1 tablet (500 mg) by mouth 2 times daily for 7 days.  #1. Bacteria, >100,000 colonies/mL Escherichia coli, is SUSCEPTIBLE to Antibiotic.    As per [facility] ED Lab Result protocol, no change in antibiotic therapy.

## 2021-05-25 ENCOUNTER — HOSPITAL ENCOUNTER (EMERGENCY)
Facility: CLINIC | Age: 21
Discharge: HOME | End: 2021-05-25
Attending: EMERGENCY MEDICINE | Admitting: EMERGENCY MEDICINE
Payer: OTHER MISCELLANEOUS

## 2021-05-25 VITALS
SYSTOLIC BLOOD PRESSURE: 120 MMHG | DIASTOLIC BLOOD PRESSURE: 83 MMHG | HEART RATE: 84 BPM | TEMPERATURE: 97.1 F | RESPIRATION RATE: 14 BRPM | OXYGEN SATURATION: 100 %

## 2021-05-25 DIAGNOSIS — T30.0 FIRST DEGREE BURN: ICD-10-CM

## 2021-05-25 PROCEDURE — 99282 EMERGENCY DEPT VISIT SF MDM: CPT

## 2021-05-25 ASSESSMENT — ENCOUNTER SYMPTOMS: WOUND: 1

## 2021-05-25 NOTE — LETTER
May 25, 2021      To Whom It May Concern:      Dionne Schulz was seen in our Emergency Department today, 05/25/21.  I expect her condition to improve over the next day.  She may return to work/school when improved.    Sincerely,        Manuel Hernandez RN
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
## 2021-12-29 LAB
ABO/RH(D): NORMAL
ANTIBODY SCREEN: NEGATIVE
SPECIMEN EXPIRATION DATE: NORMAL

## 2021-12-29 PROCEDURE — 99284 EMERGENCY DEPT VISIT MOD MDM: CPT | Mod: 25

## 2021-12-29 PROCEDURE — 86850 RBC ANTIBODY SCREEN: CPT | Performed by: EMERGENCY MEDICINE

## 2021-12-29 PROCEDURE — 86901 BLOOD TYPING SEROLOGIC RH(D): CPT | Performed by: EMERGENCY MEDICINE

## 2021-12-29 PROCEDURE — 84702 CHORIONIC GONADOTROPIN TEST: CPT | Performed by: EMERGENCY MEDICINE

## 2021-12-29 PROCEDURE — 85025 COMPLETE CBC W/AUTO DIFF WBC: CPT | Performed by: EMERGENCY MEDICINE

## 2021-12-29 PROCEDURE — 36415 COLL VENOUS BLD VENIPUNCTURE: CPT | Performed by: EMERGENCY MEDICINE

## 2021-12-29 ASSESSMENT — MIFFLIN-ST. JEOR: SCORE: 1448.08

## 2021-12-30 ENCOUNTER — HOSPITAL ENCOUNTER (EMERGENCY)
Facility: CLINIC | Age: 21
Discharge: HOME | End: 2021-12-30
Attending: EMERGENCY MEDICINE | Admitting: EMERGENCY MEDICINE
Payer: COMMERCIAL

## 2021-12-30 ENCOUNTER — APPOINTMENT (OUTPATIENT)
Dept: ULTRASOUND IMAGING | Facility: CLINIC | Age: 21
End: 2021-12-30
Attending: EMERGENCY MEDICINE
Payer: COMMERCIAL

## 2021-12-30 VITALS
RESPIRATION RATE: 16 BRPM | HEART RATE: 76 BPM | BODY MASS INDEX: 26.46 KG/M2 | SYSTOLIC BLOOD PRESSURE: 142 MMHG | DIASTOLIC BLOOD PRESSURE: 99 MMHG | HEIGHT: 64 IN | OXYGEN SATURATION: 99 % | TEMPERATURE: 98.1 F | WEIGHT: 155 LBS

## 2021-12-30 DIAGNOSIS — N93.9 VAGINAL BLEEDING: ICD-10-CM

## 2021-12-30 LAB
ANION GAP SERPL CALCULATED.3IONS-SCNC: 5 MMOL/L (ref 3–14)
B-HCG SERPL-ACNC: <1 IU/L (ref 0–5)
BASOPHILS # BLD AUTO: 0.1 10E3/UL (ref 0–0.2)
BASOPHILS NFR BLD AUTO: 1 %
BUN SERPL-MCNC: 18 MG/DL (ref 7–30)
CALCIUM SERPL-MCNC: 8.6 MG/DL (ref 8.5–10.1)
CHLORIDE BLD-SCNC: 110 MMOL/L (ref 94–109)
CO2 SERPL-SCNC: 24 MMOL/L (ref 20–32)
CREAT SERPL-MCNC: 0.79 MG/DL (ref 0.52–1.04)
EOSINOPHIL # BLD AUTO: 0.3 10E3/UL (ref 0–0.7)
EOSINOPHIL NFR BLD AUTO: 4 %
ERYTHROCYTE [DISTWIDTH] IN BLOOD BY AUTOMATED COUNT: 12.6 % (ref 10–15)
GFR SERPL CREATININE-BSD FRML MDRD: >90 ML/MIN/1.73M2
GLUCOSE BLD-MCNC: 97 MG/DL (ref 70–99)
HCT VFR BLD AUTO: 38.7 % (ref 35–47)
HGB BLD-MCNC: 12.9 G/DL (ref 11.7–15.7)
HOLD SPECIMEN: NORMAL
IMM GRANULOCYTES # BLD: 0 10E3/UL
IMM GRANULOCYTES NFR BLD: 0 %
LYMPHOCYTES # BLD AUTO: 3.4 10E3/UL (ref 0.8–5.3)
LYMPHOCYTES NFR BLD AUTO: 45 %
MCH RBC QN AUTO: 28.9 PG (ref 26.5–33)
MCHC RBC AUTO-ENTMCNC: 33.3 G/DL (ref 31.5–36.5)
MCV RBC AUTO: 87 FL (ref 78–100)
MONOCYTES # BLD AUTO: 0.4 10E3/UL (ref 0–1.3)
MONOCYTES NFR BLD AUTO: 5 %
NEUTROPHILS # BLD AUTO: 3.4 10E3/UL (ref 1.6–8.3)
NEUTROPHILS NFR BLD AUTO: 45 %
NRBC # BLD AUTO: 0 10E3/UL
NRBC BLD AUTO-RTO: 0 /100
PLATELET # BLD AUTO: 271 10E3/UL (ref 150–450)
POTASSIUM BLD-SCNC: 3.6 MMOL/L (ref 3.4–5.3)
RBC # BLD AUTO: 4.46 10E6/UL (ref 3.8–5.2)
SODIUM SERPL-SCNC: 139 MMOL/L (ref 133–144)
WBC # BLD AUTO: 7.6 10E3/UL (ref 4–11)

## 2021-12-30 PROCEDURE — 36415 COLL VENOUS BLD VENIPUNCTURE: CPT | Performed by: EMERGENCY MEDICINE

## 2021-12-30 PROCEDURE — 80048 BASIC METABOLIC PNL TOTAL CA: CPT | Performed by: EMERGENCY MEDICINE

## 2021-12-30 PROCEDURE — 93976 VASCULAR STUDY: CPT

## 2021-12-30 ASSESSMENT — ENCOUNTER SYMPTOMS
LIGHT-HEADEDNESS: 1
ABDOMINAL PAIN: 1
NAUSEA: 0
VOMITING: 0
FEVER: 0
SHORTNESS OF BREATH: 0

## 2021-12-30 NOTE — LETTER
December 30, 2021      To Whom It May Concern:      Dionne Zuletamarilinshanta was seen in our Emergency Department today, 12/30/21.  Please excuse her from work 12/30.      Sincerely,        Pankaj Gunn MD

## 2021-12-30 NOTE — ED PROVIDER NOTES
History     Chief Complaint:  Vaginal Bleeding       HPI   Dionne Schulz is a 21 year old female who presents with vaginal bleeding. She has a history of von Willebrand's along with trudy danlos syndrome. She began having her menstrual period which is typically quite heavy, but today she states that she has been going through a tampon every hour. She also is having some abdominal pain which again is common, but today it is more significant than typical. She had taken a Pamperin earlier in the day. Given the continued bleeding she had spoken with her hematologist and OB doctor who recommended she come to the hospital for evaluation. She states that the bleeding is actually slowed over the last 1 to 2 hours.    Allergies:  Augmentin  Azithromycin  Cefprozil  Cephalosporins  Penicillins     Medications:    ALBUTEROL SULFATE 0.083 % IN NEBU  benzonatate (TESSALON) 200 MG capsule  celecoxib (CELEBREX) 200 MG capsule  CLINDAMYCIN PALMITATE 75 MG/5ML OR SUSR  ibuprofen (ADVIL/MOTRIN) 600 MG tablet  MIRALAX OR POWD  MOTRIN 100 MG/5ML OR SUSP  order for DME  SEPTRA 200-40 MG/5ML OR SUSP  SEPTRA 200-40 MG/5ML OR SUSP  SIMPLY STUFFY 15 MG/5ML OR LIQD  TYLENOL 500 MG/15ML OR LIQD  TYLENOL CHILDRENS 160 MG/5ML OR SUSP        Past Medical History:    Past Medical History:   Diagnosis Date     Hypertrophy of tonsil with adenoids      Primary hypotony of eye 11/99       Patient Active Problem List    Diagnosis Date Noted     Hypertrophy of tonsils with hypertrophy of adenoids 09/11/2002     Priority: Medium     Problem list name updated by automated process. Provider to review       Lack of coordination 09/11/2002     Priority: Medium     Truncal Hypotonia          Past Surgical History:    Past Surgical History:   Procedure Laterality Date     HC REMOVE TONSILS/ADENOIDS,<12 Y/O      T & A <11y.o.        Family History:    family history includes Alcohol/Drug in her father; Allergies in her mother; Cancer in her father and  "mother; Cardiovascular in her mother; Diabetes in her mother.    Social History:   reports that she has never smoked. She has never used smokeless tobacco.    PCP: Maria E Hurt     Review of Systems   Constitutional: Negative for fever.   Respiratory: Negative for shortness of breath.    Cardiovascular: Negative for chest pain.   Gastrointestinal: Positive for abdominal pain. Negative for nausea and vomiting.   Genitourinary: Positive for vaginal bleeding.   Neurological: Positive for light-headedness.   All other systems reviewed and are negative.        Physical Exam     Patient Vitals for the past 24 hrs:   BP Temp Temp src Pulse Resp SpO2 Height Weight   12/29/21 2317 (!) 138/91 98.1  F (36.7  C) Temporal 80 16 99 % 1.626 m (5' 4\") 70.3 kg (155 lb)        Physical Exam  General: Appears well-developed and well-nourished.   Head: No signs of trauma.   CV: Normal rate and regular rhythm.    Resp: Effort normal and breath sounds normal. No respiratory distress.   GI: Soft. There is mild lower tenderness.  No rebound or guarding.  Normal bowel sounds.  No CVA tenderness.  Pelvic:  Minor blood in vaginal vault without significant further bleeding.  MSK: Normal range of motion.   Neuro: The patient is alert and oriented. Speech normal.  Skin: Skin is warm and dry. No rash noted.   Psych: normal mood and affect. behavior is normal.       Emergency Department Course     Imaging:  US Pelvis Cmplt w Transvag & Doppler LmtPel Duplex Limited   Final Result   IMPRESSION:     1.  Multiple small peripheral follicles involve the ovaries. Findings can be seen with polycystic ovarian syndrome and should be correlated with clinical history and laboratory evaluation.      2.  Uterus and endometrium unremarkable.      3.  No free fluid in the pelvis.                           All imaging results were discussed with the patient and fiance who voiced understanding of the findings.    Laboratory:  Labs Ordered and Resulted from Time " of ED Arrival to Time of ED Departure   BASIC METABOLIC PANEL - Abnormal       Result Value    Sodium 139      Potassium 3.6      Chloride 110 (*)     Carbon Dioxide (CO2) 24      Anion Gap 5      Urea Nitrogen 18      Creatinine 0.79      Calcium 8.6      Glucose 97      GFR Estimate >90     HCG QUANTITATIVE PREGNANCY - Normal    hCG Quantitative <1     CBC WITH PLATELETS AND DIFFERENTIAL    WBC Count 7.6      RBC Count 4.46      Hemoglobin 12.9      Hematocrit 38.7      MCV 87      MCH 28.9      MCHC 33.3      RDW 12.6      Platelet Count 271      % Neutrophils 45      % Lymphocytes 45      % Monocytes 5      % Eosinophils 4      % Basophils 1      % Immature Granulocytes 0      NRBCs per 100 WBC 0      Absolute Neutrophils 3.4      Absolute Lymphocytes 3.4      Absolute Monocytes 0.4      Absolute Eosinophils 0.3      Absolute Basophils 0.1      Absolute Immature Granulocytes 0.0      Absolute NRBCs 0.0     TYPE AND SCREEN, ADULT    ABO/RH(D) O POS      Antibody Screen Negative      SPECIMEN EXPIRATION DATE [id number]     ABO/RH TYPE AND SCREEN      Emergency Department Course:  Past medical records, nursing notes, and vitals reviewed.  I performed an exam of the patient and obtained history, as documented above.    I rechecked the patient. Findings and plan explained to the Patient and fiance. Patient was discharged.    Impression & Plan      Medical Decision Making:  Patient is a 21-year-old woman who presents with vaginal bleeding.  She has von Willebrand's disease and reports that today she had significant vaginal bleeding requiring her to change her tampon every hour.  She spoken with her hematologist and gynecologist to recommend she come to the ER for evaluation.  At the time of my evaluation, the patient states that her bleeding actually slowed.  I did perform a pelvic exam there was some minor blood in the vaginal vault, but no significant continued bleeding.  Patient's vital signs were appropriate  and her hemoglobin was appropriate.  Did obtain a pelvic ultrasound and this did not show any concerning findings.  I did speak with Dr. Peguero with [facility] along with Dr. Keene with [facility].  Given reassuring workup and slowed bleeding, pt was discharged home with recommendation for close follow up with OBGYN and instructions to return to ER for worsening bleeding or further concerns.      Diagnosis:    ICD-10-CM    1. Vaginal bleeding  N93.9           12/30/2021   Pankaj Gunn MD [place], John A, MD  12/30/21 0717

## 2021-12-30 NOTE — ED TRIAGE NOTES
Heavy vaginal bleeding. History of Von Willebrands. Patient states she has used 20 tampons in the last 24 hours.

## 2022-09-20 NOTE — RESULT ENCOUNTER NOTE
Final Urine Culture Report on 10/30/19  Emergency Dept/Urgent Care discharge antibiotic prescribed: Ciprofloxacin (Cipro) 500 mg tablet, 1 tablet (500 mg) by mouth 2 times daily for 7 days.  #1. Bacteria, 50,000 to 100,000 colonies/ml Escherichia coli, is SUSCEPTIBLE to Antibiotic.    As per [facility] ED Lab Result protocol, no change in antibiotic therapy. septal deviation L

## 2023-02-20 ENCOUNTER — TELEPHONE (OUTPATIENT)
Dept: UROLOGY | Facility: CLINIC | Age: 23
End: 2023-02-20
Payer: COMMERCIAL

## 2023-02-20 NOTE — TELEPHONE ENCOUNTER
Health Call Center    Phone Message    May a detailed message be left on voicemail: yes     Reason for Call: Symptoms or Concerns    Current symptom or concern: extreme fatigue and pain in right back side - patient says she has medullary sponge kidney    Symptoms have been present for: 30 hour(s)    Has patient previously been seen for this? No    Are there any new or worsening symptoms? Yes: new    Writer wasn't sure if this should be scheduled with a urology or nephrology. Patient called on the urology line and wanted to schedule with a urologist but not on protocols. Please call patient back and advise. Thank you.    Action Taken: Message routed to:  [facility] ([facility]): Urology    Travel Screening: Not Applicable

## 2023-02-21 ENCOUNTER — PATIENT OUTREACH (OUTPATIENT)
Dept: UROLOGY | Facility: CLINIC | Age: 23
End: 2023-02-21
Payer: COMMERCIAL

## 2023-02-21 NOTE — PROGRESS NOTES
RNCC called patient back after she left a voicemail    Patient feeling well overall and no concerns for signs of infection    Pt was recently treated and is on day 7 of Doxy abx for a sinus infection and is feeling some tiredness and mild back pain but feels could be due to the abx.    RNCC advised on when to present to ED based on signs and sx of infection    Pt verbalized understanding and agreeable to plan    Pt stated never been formally diagnosed with kidney stones, but has family hx of this    Pt also reports she has MSK and that this has been confirmed and long standing diagnosis for her    RNCC does see this in notes as well.    Last CT completed 2019 and no further CT since then    No imaging to confirm stones in chart    RNCC advised patient that I will look into who can see her and if will be Nephrology or Urology and then update the patient.  Pt verbalized understanding and agreeable to plan      [facility]  [phone]

## 2023-02-21 NOTE — CONFIDENTIAL NOTE
RNCC called pt and lm for call back with direct number to discuss symptoms      [facility]  [phone]

## 2023-02-21 NOTE — PROGRESS NOTES
RNCC called and lm for pt with direct cb number to discuss symptoms      [facility]  [phone]

## 2023-02-25 ENCOUNTER — TELEPHONE (OUTPATIENT)
Dept: MULTI SPECIALTY CLINIC | Facility: CLINIC | Age: 23
End: 2023-02-25
Payer: COMMERCIAL

## 2023-02-25 NOTE — TELEPHONE ENCOUNTER
Left Voicemail (1st Attempt) for the patient to call back and schedule the following:    Appointment type: referral to nephrology  Provider: niles Contreras elfering  Return date: next available   Specialty phone number: [phone]  Additional appointment(s) needed: lab  Additonal Notes: n/a

## 2023-02-27 ENCOUNTER — TELEPHONE (OUTPATIENT)
Dept: MULTI SPECIALTY CLINIC | Facility: CLINIC | Age: 23
End: 2023-02-27
Payer: COMMERCIAL

## 2023-02-27 NOTE — TELEPHONE ENCOUNTER
Left Voicemail (2nd Attempt) for the patient to call back and schedule the following:    Appointment type: New pt   Provider: [place]   Return date: next available  Specialty phone number: [phone]  Additional appointment(s) needed: lab  Additonal Notes: n/a

## 2023-03-30 ENCOUNTER — APPOINTMENT (OUTPATIENT)
Dept: ULTRASOUND IMAGING | Facility: CLINIC | Age: 23
End: 2023-03-30
Attending: EMERGENCY MEDICINE
Payer: COMMERCIAL

## 2023-03-30 ENCOUNTER — NURSE TRIAGE (OUTPATIENT)
Dept: NURSING | Facility: CLINIC | Age: 23
End: 2023-03-30
Payer: COMMERCIAL

## 2023-03-30 ENCOUNTER — HOSPITAL ENCOUNTER (EMERGENCY)
Facility: CLINIC | Age: 23
Discharge: HOME | End: 2023-03-30
Attending: EMERGENCY MEDICINE | Admitting: EMERGENCY MEDICINE
Payer: COMMERCIAL

## 2023-03-30 VITALS
DIASTOLIC BLOOD PRESSURE: 82 MMHG | SYSTOLIC BLOOD PRESSURE: 119 MMHG | HEIGHT: 64 IN | RESPIRATION RATE: 16 BRPM | TEMPERATURE: 98.2 F | HEART RATE: 71 BPM | WEIGHT: 165 LBS | OXYGEN SATURATION: 99 % | BODY MASS INDEX: 28.17 KG/M2

## 2023-03-30 DIAGNOSIS — K75.9 HEPATITIS: ICD-10-CM

## 2023-03-30 LAB
ALBUMIN SERPL BCG-MCNC: 4.3 G/DL (ref 3.5–5.2)
ALP SERPL-CCNC: 108 U/L (ref 35–104)
ALT SERPL W P-5'-P-CCNC: 1156 U/L (ref 10–35)
ANION GAP SERPL CALCULATED.3IONS-SCNC: 12 MMOL/L (ref 7–15)
APAP SERPL-MCNC: <5 UG/ML (ref 10–30)
APTT PPP: 25 SECONDS (ref 22–38)
AST SERPL W P-5'-P-CCNC: 2428 U/L (ref 10–35)
BASOPHILS # BLD AUTO: 0 10E3/UL (ref 0–0.2)
BASOPHILS NFR BLD AUTO: 1 %
BILIRUB DIRECT SERPL-MCNC: 0.4 MG/DL (ref 0–0.3)
BILIRUB SERPL-MCNC: 0.7 MG/DL
BUN SERPL-MCNC: 12 MG/DL (ref 6–20)
CALCIUM SERPL-MCNC: 9.5 MG/DL (ref 8.6–10)
CHLORIDE SERPL-SCNC: 108 MMOL/L (ref 98–107)
CREAT SERPL-MCNC: 0.7 MG/DL (ref 0.51–0.95)
DEPRECATED HCO3 PLAS-SCNC: 21 MMOL/L (ref 22–29)
EOSINOPHIL # BLD AUTO: 0.1 10E3/UL (ref 0–0.7)
EOSINOPHIL NFR BLD AUTO: 2 %
ERYTHROCYTE [DISTWIDTH] IN BLOOD BY AUTOMATED COUNT: 12.8 % (ref 10–15)
GFR SERPL CREATININE-BSD FRML MDRD: >90 ML/MIN/1.73M2
GGT SERPL-CCNC: 218 U/L (ref 5–36)
GLUCOSE SERPL-MCNC: 137 MG/DL (ref 70–99)
HAV IGM SERPL QL IA: NONREACTIVE
HAV IGM SERPL QL IA: NONREACTIVE
HBV CORE IGM SERPL QL IA: NONREACTIVE
HBV CORE IGM SERPL QL IA: NONREACTIVE
HBV SURFACE AB SERPL IA-ACNC: 4.05 M[IU]/ML
HBV SURFACE AB SERPL IA-ACNC: NONREACTIVE M[IU]/ML
HBV SURFACE AG SERPL QL IA: NONREACTIVE
HCG SERPL QL: NEGATIVE
HCT VFR BLD AUTO: 38.2 % (ref 35–47)
HCV AB SERPL QL IA: NONREACTIVE
HCV AB SERPL QL IA: NONREACTIVE
HGB BLD-MCNC: 12.7 G/DL (ref 11.7–15.7)
IMM GRANULOCYTES # BLD: 0 10E3/UL
IMM GRANULOCYTES NFR BLD: 0 %
INR PPP: 1.08 (ref 0.85–1.15)
LIPASE SERPL-CCNC: 52 U/L (ref 13–60)
LYMPHOCYTES # BLD AUTO: 1.2 10E3/UL (ref 0.8–5.3)
LYMPHOCYTES NFR BLD AUTO: 23 %
MCH RBC QN AUTO: 28.6 PG (ref 26.5–33)
MCHC RBC AUTO-ENTMCNC: 33.2 G/DL (ref 31.5–36.5)
MCV RBC AUTO: 86 FL (ref 78–100)
MONOCYTES # BLD AUTO: 0.4 10E3/UL (ref 0–1.3)
MONOCYTES NFR BLD AUTO: 7 %
MONOCYTES NFR BLD AUTO: NEGATIVE %
NEUTROPHILS # BLD AUTO: 3.3 10E3/UL (ref 1.6–8.3)
NEUTROPHILS NFR BLD AUTO: 67 %
NRBC # BLD AUTO: 0 10E3/UL
NRBC BLD AUTO-RTO: 0 /100
PLATELET # BLD AUTO: 203 10E3/UL (ref 150–450)
POTASSIUM SERPL-SCNC: 4.2 MMOL/L (ref 3.4–5.3)
PROT SERPL-MCNC: 6.7 G/DL (ref 6.4–8.3)
RBC # BLD AUTO: 4.44 10E6/UL (ref 3.8–5.2)
SODIUM SERPL-SCNC: 141 MMOL/L (ref 136–145)
WBC # BLD AUTO: 5 10E3/UL (ref 4–11)

## 2023-03-30 PROCEDURE — 86706 HEP B SURFACE ANTIBODY: CPT | Performed by: EMERGENCY MEDICINE

## 2023-03-30 PROCEDURE — 36415 COLL VENOUS BLD VENIPUNCTURE: CPT | Performed by: EMERGENCY MEDICINE

## 2023-03-30 PROCEDURE — 86803 HEPATITIS C AB TEST: CPT | Performed by: EMERGENCY MEDICINE

## 2023-03-30 PROCEDURE — 82248 BILIRUBIN DIRECT: CPT | Performed by: EMERGENCY MEDICINE

## 2023-03-30 PROCEDURE — 80048 BASIC METABOLIC PNL TOTAL CA: CPT | Performed by: EMERGENCY MEDICINE

## 2023-03-30 PROCEDURE — 83690 ASSAY OF LIPASE: CPT | Performed by: EMERGENCY MEDICINE

## 2023-03-30 PROCEDURE — 87799 DETECT AGENT NOS DNA QUANT: CPT | Performed by: EMERGENCY MEDICINE

## 2023-03-30 PROCEDURE — 85610 PROTHROMBIN TIME: CPT | Performed by: EMERGENCY MEDICINE

## 2023-03-30 PROCEDURE — 80053 COMPREHEN METABOLIC PANEL: CPT | Performed by: EMERGENCY MEDICINE

## 2023-03-30 PROCEDURE — 80143 DRUG ASSAY ACETAMINOPHEN: CPT | Performed by: EMERGENCY MEDICINE

## 2023-03-30 PROCEDURE — 85730 THROMBOPLASTIN TIME PARTIAL: CPT | Performed by: EMERGENCY MEDICINE

## 2023-03-30 PROCEDURE — 86705 HEP B CORE ANTIBODY IGM: CPT | Performed by: EMERGENCY MEDICINE

## 2023-03-30 PROCEDURE — 84703 CHORIONIC GONADOTROPIN ASSAY: CPT | Performed by: EMERGENCY MEDICINE

## 2023-03-30 PROCEDURE — 76705 ECHO EXAM OF ABDOMEN: CPT

## 2023-03-30 PROCEDURE — 87522 HEPATITIS C REVRS TRNSCRPJ: CPT | Performed by: EMERGENCY MEDICINE

## 2023-03-30 PROCEDURE — 86709 HEPATITIS A IGM ANTIBODY: CPT | Performed by: EMERGENCY MEDICINE

## 2023-03-30 PROCEDURE — 85025 COMPLETE CBC W/AUTO DIFF WBC: CPT | Performed by: EMERGENCY MEDICINE

## 2023-03-30 PROCEDURE — 96374 THER/PROPH/DIAG INJ IV PUSH: CPT

## 2023-03-30 PROCEDURE — 258N000003 HC RX IP 258 OP 636: Performed by: EMERGENCY MEDICINE

## 2023-03-30 PROCEDURE — 82977 ASSAY OF GGT: CPT | Performed by: EMERGENCY MEDICINE

## 2023-03-30 PROCEDURE — 250N000011 HC RX IP 250 OP 636: Performed by: EMERGENCY MEDICINE

## 2023-03-30 PROCEDURE — 86308 HETEROPHILE ANTIBODY SCREEN: CPT | Performed by: EMERGENCY MEDICINE

## 2023-03-30 PROCEDURE — 99285 EMERGENCY DEPT VISIT HI MDM: CPT | Mod: 25

## 2023-03-30 PROCEDURE — 96361 HYDRATE IV INFUSION ADD-ON: CPT

## 2023-03-30 RX ORDER — ONDANSETRON 4 MG/1
4 TABLET, ORALLY DISINTEGRATING ORAL EVERY 8 HOURS PRN
Qty: 10 TABLET | Refills: 0 | Status: SHIPPED | OUTPATIENT
Start: 2023-03-30 | End: 2023-04-02

## 2023-03-30 RX ORDER — TRAMADOL HYDROCHLORIDE 50 MG/1
50 TABLET ORAL EVERY 6 HOURS PRN
Qty: 20 TABLET | Refills: 0 | Status: SHIPPED | OUTPATIENT
Start: 2023-03-30

## 2023-03-30 RX ORDER — MORPHINE SULFATE 4 MG/ML
4 INJECTION, SOLUTION INTRAMUSCULAR; INTRAVENOUS
Status: COMPLETED | OUTPATIENT
Start: 2023-03-30 | End: 2023-03-30

## 2023-03-30 RX ADMIN — MORPHINE SULFATE 4 MG: 4 INJECTION, SOLUTION INTRAMUSCULAR; INTRAVENOUS at 08:02

## 2023-03-30 RX ADMIN — SODIUM CHLORIDE 1000 ML: 9 INJECTION, SOLUTION INTRAVENOUS at 08:02

## 2023-03-30 ASSESSMENT — ENCOUNTER SYMPTOMS
ABDOMINAL DISTENTION: 1
NAUSEA: 0
BACK PAIN: 1
VOMITING: 0
DIZZINESS: 1
FEVER: 0
ABDOMINAL PAIN: 1

## 2023-03-30 ASSESSMENT — ACTIVITIES OF DAILY LIVING (ADL)
ADLS_ACUITY_SCORE: 35
ADLS_ACUITY_SCORE: 35

## 2023-03-30 NOTE — ED NOTES
DATE:  3/30/2023   TIME OF RECEIPT FROM LAB:  0727  LAB TEST:  AST and ALT  LAB VALUE:  AST 2428, ALT 1156  RESULTS GIVEN WITH READ-BACK TO (PROVIDER):  Joel Fuentes  TIME LAB VALUE REPORTED TO PROVIDER:   0727

## 2023-03-30 NOTE — ED PROVIDER NOTES
"    History     Chief Complaint:  Abdominal Pain       HPI   Dionne Torres is a 23 year old female with a history of Von Willebrand disease, Ehler's-Danlos syndrome  who presents with epigastric pain and swelling; this is mostly concentrated to the upper right quadrant. It is also present in her right flank. It is accompanied by dizziness and a general \"foggy\" feeling. No fever, nausea, or vomiting. No alcohol use.     Independent Historian: patient        ROS:  Review of Systems   Constitutional: Negative for fever.   Gastrointestinal: Positive for abdominal distention and abdominal pain. Negative for nausea and vomiting.   Musculoskeletal: Positive for back pain.   Neurological: Positive for dizziness.   All other systems reviewed and are negative.      Allergies:  Augmentin  Azithromycin  Cefprozil  Cephalosporins  Penicillins     Medications:    Albuterol   Celecoxib   Nuvaring     Past Medical History:    Hypertrophy of tonsil with adenoids   Asthma  Von Willebrand disease, type I  Heart murmur  OCD   ADHD  Ehler's-Danlos syndrome  Primary hypotony of eye     Past Surgical History:    Adenoidectomy and tonsillectomy   Knee surgery   Myringotomy with tube placement  Gower teeth extraction       Family History:    Mother: allergies, diabetes, cardiovascular disease  Father: cancer, alcohol/drug     Social History:  Patient presents to the ED with   PCP: Maria E Hurt     Physical Exam     Patient Vitals for the past 24 hrs:   BP Temp Temp src Pulse Resp SpO2 Height Weight   03/30/23 1108 -- -- -- -- -- -- 1.626 m (5' 4\") 74.8 kg (165 lb)   03/30/23 1100 119/82 98.2  F (36.8  C) Oral 71 -- 99 % -- --   03/30/23 0930 -- -- -- -- -- 99 % -- --   03/30/23 0915 119/81 -- -- -- -- 98 % -- --   03/30/23 0900 119/80 -- -- 83 -- 97 % -- --   03/30/23 0815 124/77 -- -- -- -- 97 % -- --   03/30/23 0800 119/86 -- -- 80 -- 99 % -- --   03/30/23 0745 (!) 131/92 -- -- -- -- 98 % -- --   03/30/23 0720 (!) 125/91 -- " -- 82 -- 98 % -- --   03/30/23 0633 (!) 128/100 -- -- 85 16 98 % -- --        Physical Exam  General: Patient is awake, alert and interactive when I enter the room. Appears uncomfortable  Head: The scalp, face, and head appear normal  Eyes: Conjunctivae and sclerae are normal  Neck: Normal range of motion.   CV: Regular rate.   Resp:  No respiratory distress.   GI: RUQ tenderness but abdomen is soft, no rigidity. No evidence of pulsatile mass. No fluid waves or evidence of ascites. No distension. No hernias or bruising are noted in detailed exam. No CVA tenderness.    MS: Normal tone.   Skin: Normal capillary refill noted  Neuro: Speech is normal and fluent. Face is symmetric. Moving all extremities.   Psych:  Normal affect.  Appropriate interactions.      Emergency Department Course     Imaging:  US Abdomen Limited (RUQ)   Final Result   IMPRESSION: Diffuse fatty infiltration throughout the liver. Exam is   otherwise unremarkable.      ROSALINA LECHUGA MD            SYSTEM ID:  [id number]        Report per radiology    Laboratory:  Labs Ordered and Resulted from Time of ED Arrival to Time of ED Departure   BASIC METABOLIC PANEL - Abnormal       Result Value    Sodium 141      Potassium 4.2      Chloride 108 (*)     Carbon Dioxide (CO2) 21 (*)     Anion Gap 12      Urea Nitrogen 12.0      Creatinine 0.70      Calcium 9.5      Glucose 137 (*)     GFR Estimate >90     HEPATIC FUNCTION PANEL - Abnormal    Protein Total 6.7      Albumin 4.3      Bilirubin Total 0.7      Alkaline Phosphatase 108 (*)     AST 2,428 (*)     ALT 1,156 (*)     Bilirubin Direct 0.40 (*)    ACETAMINOPHEN LEVEL - Abnormal    Acetaminophen <5.0 (*)    LIPASE - Normal    Lipase 52     HCG QUALITATIVE PREGNANCY - Normal    hCG Serum Qualitative Negative     INR - Normal    INR 1.08     PARTIAL THROMBOPLASTIN TIME - Normal    aPTT 25     MONONUCLEOSIS SCREEN - Normal    Mononucleosis Screen Negative     CBC WITH PLATELETS AND DIFFERENTIAL    WBC Count  5.0      RBC Count 4.44      Hemoglobin 12.7      Hematocrit 38.2      MCV 86      MCH 28.6      MCHC 33.2      RDW 12.8      Platelet Count 203      % Neutrophils 67      % Lymphocytes 23      % Monocytes 7      % Eosinophils 2      % Basophils 1      % Immature Granulocytes 0      NRBCs per 100 WBC 0      Absolute Neutrophils 3.3      Absolute Lymphocytes 1.2      Absolute Monocytes 0.4      Absolute Eosinophils 0.1      Absolute Basophils 0.0      Absolute Immature Granulocytes 0.0      Absolute NRBCs 0.0     GGT   HEPATITIS B SURFACE ANTIBODY   HEPATITIS C ANTIBODY   HEPATITIS A ANTIBODY IGM   HEPATITIS B CORE ANTIBODY IGM   HEPATITIS C RNA, QUANTITATIVE BY PCR   EBV DNA PCR QUANTITATIVE WHOLE BLOOD   ACUTE HEPATITIS PANEL   CMV QUANTITATIVE, PCR        Emergency Department Course & Assessments:  Interventions:  Medications   morphine (PF) injection 4 mg (4 mg Intravenous $Given 3/30/23 0802)   0.9% sodium chloride BOLUS (0 mLs Intravenous Stopped 3/30/23 1028)      Independent Interpretation (X-rays, CTs, rhythm strip):  RUQ US shows no gall stones     Consultations/Discussion of Management or Tests:  0939 I spoke with Dr. Villatoro of [facility] regarding patient presentation.  Discussed results of laboratory testing including elevated AST's, ALT's, alk phos and bilirubin.  I asked whether further imaging or work-up was required.  He did not think so at this time other than discussed acute hepatitis work-up.  Dr. Villatoro states that if her symptoms are well controlled she can follow-up closely with primary care and GI for repeat laboratory work.  He feels that this is most likely viral hepatitis    Assessments:  0730 I obtained the history and examined the patient as noted above.   0942 I rechecked the patient and explained findings. She is feeling improved.  Patient denies regular alcohol use.  Further denies any herbal supplements or regular use of Tylenol.  No recent travel outside the country.  Only takes albuterol  for medications.  No other new medications.  1040 I rechecked the patient and explained findings. I discussed discharge.     Disposition:  The patient was discharged to home.     Impression & Plan      Medical Decision Making:  Patient is a 23-year-old female with past medical history of von Willebrand's disease who presents to the emergency department with epigastric pain and distention.  Upon initial evaluation here she is hemodynamically stable with normal vital signs.  She is afebrile.  She is oxygenating well on room air.  She looks uncomfortable but does not appear acutely ill.  On physical exam she does have some tenderness in the right upper quadrant but overall has a soft and nonrigid abdomen.  Initial concern was for possible biliary pathology including cholelithiasis or cholecystitis.  Ultrasound was obtained which shows no evidence of stone or biliary pathology.  However LFTs were quite remarkable for elevated AST's to 2400 and AST's to 1100.  Acute hepatitis panel was ordered.  Case was discussed with [facility] who did not recommend any further work-up at this juncture.  He did not indicate further imaging or lab work was necessary at this juncture.  He recommended admission versus discharge based on patient's symptom control.  Patient felt significantly better and feels comfortable with outpatient management with pain control.  Recommended follow-up in the next 24 to 48 hours with primary care for recheck of labs and assessment.  Also recommended ASAP follow-up with GI next week.  We discussed reasons to return to the emergency department especially over the weekend.  At this point it appears most likely that this is likely due to viral hepatitis.  Low concern for portal occlusion with normal blood flow seen on ultrasound.        Diagnosis:    ICD-10-CM    1. Hepatitis  K75.9            Discharge Medications:  New Prescriptions    ONDANSETRON (ZOFRAN ODT) 4 MG ODT TAB    Take 1 tablet (4 mg) by  mouth every 8 hours as needed for nausea    TRAMADOL (ULTRAM) 50 MG TABLET    Take 1 tablet (50 mg) by mouth every 6 hours as needed for severe pain (7-10)          Scribe Disclosure:  I, Joshua Kjer, am serving as a scribe at 7:33 AM on 3/30/2023 to document services personally performed by Joel Fuentes MD based on my observations and the provider's statements to me.    3/30/2023   Joel Fuentes MD Battista, Christopher Joseph, MD  03/30/23 1537

## 2023-03-30 NOTE — ED TRIAGE NOTES
Pt presents with epigastric pain that she has had every night for the past 3 nights. Pain improves during the day and then returns in the evening. Pt adds that she has a bleeding disorder.      Triage Assessment     Row Name 03/30/23 0631       Triage Assessment (Adult)    Airway WDL WDL       Respiratory WDL    Respiratory WDL WDL       Skin Circulation/Temperature WDL    Skin Circulation/Temperature WDL WDL       Cardiac WDL    Cardiac WDL WDL       Peripheral/Neurovascular WDL    Peripheral Neurovascular WDL WDL       Cognitive/Neuro/Behavioral WDL    Cognitive/Neuro/Behavioral WDL WDL

## 2023-03-30 NOTE — LETTER
March 30, 2023      To Whom It May Concern:      Dionne Torres was seen in our Emergency Department today, 03/30/23. I expect her condition to improve over 3 days. She may return to work/school once she is cleared by her Provider at her next follow up appointment.     Sincerely,        Denise Davidson RN

## 2023-03-30 NOTE — Clinical Note
Dionne Torres was seen and treated in our emergency department on 3/30/2023.  She may return to work on 04/03/2023.       If you have any questions or concerns, please don't hesitate to call.      Joel Fuentes MD

## 2023-03-30 NOTE — TELEPHONE ENCOUNTER
Patient is calling to report abdominal pain.    It started about 3 nights ago but she was okay during the day.  It happened again yesterday and okay during the day.  Tonight, her pain began at 8:30pm and she has been having constant severe pain.  She states that she also has pain now in her right flank.    Disposition:  Ed now.  Care advice given. Informed patient of closest ED to her location.    Mary Calvert RN, BSN Nurse Triage Advisor 3/30/2023 5:19 AM     Reason for Disposition    [1] SEVERE pain (e.g., excruciating) AND [2] present > 1 hour    Additional Information    Negative: SEVERE difficulty breathing (e.g., struggling for each breath, speaks in single words)    Negative: Shock suspected (e.g., cold/pale/clammy skin, too weak to stand, low BP, rapid pulse)    Negative: Difficult to awaken or acting confused (e.g., disoriented, slurred speech)    Negative: Passed out (i.e., lost consciousness, collapsed and was not responding)    Negative: Visible sweat on face or sweat dripping down face    Negative: Sounds like a life-threatening emergency to the triager    Protocols used: ABDOMINAL PAIN - UPPER-A-AH

## 2023-03-31 ENCOUNTER — TELEPHONE (OUTPATIENT)
Dept: EMERGENCY MEDICINE | Facility: CLINIC | Age: 23
End: 2023-03-31
Payer: COMMERCIAL

## 2023-03-31 LAB
CMV DNA SPEC NAA+PROBE-ACNC: NOT DETECTED IU/ML
EBV DNA # SPEC NAA+PROBE: NOT DETECTED COPIES/ML

## 2023-03-31 NOTE — TELEPHONE ENCOUNTER
[facility] Emergency Department/Urgent Care Lab result notification:    Reason for call    Notify the patient/parent of lab results    Review ED providers recommendations/discharge instructions (if necessary)    Advise per [facility] ED lab result protocol    Lab result  Component      Latest Ref Rng & Units 3/30/2023   GGT      5 - 36 U/L 218 (H)     Left voicemail message requesting a call back to [phone] between 9 a.m. and 5:30 p.m. for patient's ED/UC lab results.      Panfilo Marquez, RN  [facility] Result RN  [facility] Emergency Dept Lab Result RN  Ph# [phone]

## 2023-04-02 ENCOUNTER — HEALTH MAINTENANCE LETTER (OUTPATIENT)
Age: 23
End: 2023-04-02

## 2023-04-02 LAB — HCV RNA SERPL NAA+PROBE-ACNC: NOT DETECTED IU/ML

## 2023-04-02 NOTE — TELEPHONE ENCOUNTER
"[facility]) Emergency Department/Urgent Care Lab result notification  [Note:  ED Lab Results RN will reference the [facility] Emergency Dept visit note prior to contacting patient AND/OR prior to consulting Emergency Dept Provider.  Highlights of Emergency Dept visit in information summary at the bottom of this telephone note]    1. Reason for call    Notify the patient/parent of lab results    Assess patient symptoms    Review ED providers recommendations/discharge instructions (if necessary)    Advise per [facility] ED lab result protocol        3. RN Assessment (Patient's current Symptoms):    Time of call: 4/2/2023 12:12 PM    Assessment: call patient to discuss lab results patient interrupts and says \"I already have an appointment on Monday and have been following my labs on mychart\"    4. RN Recommendations/Instructions per [place] ED lab result protocol    [facility] ED lab result protocol used: Misc    Patient/parent notified of lab result and treatment recommendations (Yes/No): YES    RN reviewed information about patient encouraged to discuss elevated GGT level interpretation and recommendation with provider appointment on Monday - patient verbalized understanding.      7. Copy of Lab result   Component      Latest Ref Rng 3/30/2023   GGT      5 - 36 U/L 218 (H)               Shadia Fuller RN  [facility] [employer] [place]  Emergency Dept Lab Result RN  Ph# [phone]          "

## 2024-02-05 ENCOUNTER — LAB REQUISITION (OUTPATIENT)
Dept: LAB | Facility: CLINIC | Age: 24
End: 2024-02-05

## 2024-02-05 DIAGNOSIS — Z01.419 ENCOUNTER FOR GYNECOLOGICAL EXAMINATION (GENERAL) (ROUTINE) WITHOUT ABNORMAL FINDINGS: ICD-10-CM

## 2024-02-05 DIAGNOSIS — N93.9 ABNORMAL UTERINE AND VAGINAL BLEEDING, UNSPECIFIED: ICD-10-CM

## 2024-02-05 LAB
ERYTHROCYTE [DISTWIDTH] IN BLOOD BY AUTOMATED COUNT: 12.5 % (ref 10–15)
HBA1C MFR BLD: 5.1 %
HCT VFR BLD AUTO: 40 % (ref 35–47)
HGB BLD-MCNC: 13.9 G/DL (ref 11.7–15.7)
MCH RBC QN AUTO: 29.8 PG (ref 26.5–33)
MCHC RBC AUTO-ENTMCNC: 34.8 G/DL (ref 31.5–36.5)
MCV RBC AUTO: 86 FL (ref 78–100)
PLATELET # BLD AUTO: 294 10E3/UL (ref 150–450)
RBC # BLD AUTO: 4.67 10E6/UL (ref 3.8–5.2)
WBC # BLD AUTO: 8.2 10E3/UL (ref 4–11)

## 2024-02-05 PROCEDURE — 84443 ASSAY THYROID STIM HORMONE: CPT | Performed by: OBSTETRICS & GYNECOLOGY

## 2024-02-05 PROCEDURE — 83036 HEMOGLOBIN GLYCOSYLATED A1C: CPT | Performed by: OBSTETRICS & GYNECOLOGY

## 2024-02-05 PROCEDURE — 85027 COMPLETE CBC AUTOMATED: CPT | Performed by: OBSTETRICS & GYNECOLOGY

## 2024-02-05 PROCEDURE — 84466 ASSAY OF TRANSFERRIN: CPT | Performed by: OBSTETRICS & GYNECOLOGY

## 2024-02-05 PROCEDURE — 83550 IRON BINDING TEST: CPT | Performed by: OBSTETRICS & GYNECOLOGY

## 2024-02-05 PROCEDURE — 82728 ASSAY OF FERRITIN: CPT | Performed by: OBSTETRICS & GYNECOLOGY

## 2024-02-06 LAB
FERRITIN SERPL-MCNC: 76 NG/ML (ref 6–175)
IRON BINDING CAPACITY (ROCHE): 275 UG/DL (ref 240–430)
IRON SATN MFR SERPL: 29 % (ref 15–46)
IRON SERPL-MCNC: 81 UG/DL (ref 37–145)
TRANSFERRIN SERPL-MCNC: 246 MG/DL (ref 200–360)
TSH SERPL DL<=0.005 MIU/L-ACNC: 2.82 UIU/ML (ref 0.3–4.2)

## 2024-06-08 ENCOUNTER — HEALTH MAINTENANCE LETTER (OUTPATIENT)
Age: 24
End: 2024-06-08

## 2025-06-15 ENCOUNTER — HEALTH MAINTENANCE LETTER (OUTPATIENT)
Age: 25
End: 2025-06-15